# Patient Record
Sex: MALE | Race: WHITE
[De-identification: names, ages, dates, MRNs, and addresses within clinical notes are randomized per-mention and may not be internally consistent; named-entity substitution may affect disease eponyms.]

---

## 2021-06-02 ENCOUNTER — TRANSCRIPTION ENCOUNTER (OUTPATIENT)
Age: 51
End: 2021-06-02

## 2021-07-29 ENCOUNTER — EMERGENCY (EMERGENCY)
Facility: HOSPITAL | Age: 51
LOS: 0 days | Discharge: HOME | End: 2021-07-29
Attending: EMERGENCY MEDICINE
Payer: COMMERCIAL

## 2021-07-29 VITALS
RESPIRATION RATE: 17 BRPM | SYSTOLIC BLOOD PRESSURE: 130 MMHG | OXYGEN SATURATION: 97 % | DIASTOLIC BLOOD PRESSURE: 84 MMHG | TEMPERATURE: 99 F | HEART RATE: 96 BPM

## 2021-07-29 VITALS — WEIGHT: 197.98 LBS | HEIGHT: 71 IN
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
## 2021-07-29 DIAGNOSIS — M54.2 CERVICALGIA: ICD-10-CM
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
## 2021-07-29 DIAGNOSIS — S06.0X0A CONCUSSION WITHOUT LOSS OF CONSCIOUSNESS, INITIAL ENCOUNTER: ICD-10-CM

## 2021-07-29 DIAGNOSIS — S16.1XXA STRAIN OF MUSCLE, FASCIA AND TENDON AT NECK LEVEL, INITIAL ENCOUNTER: ICD-10-CM

## 2021-07-29 DIAGNOSIS — H53.8 OTHER VISUAL DISTURBANCES: ICD-10-CM
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
## 2021-07-29 PROCEDURE — 96372 THER/PROPH/DIAG INJ SC/IM: CPT

## 2021-07-29 PROCEDURE — 99284 EMERGENCY DEPT VISIT MOD MDM: CPT

## 2021-07-29 PROCEDURE — 99283 EMERGENCY DEPT VISIT LOW MDM: CPT | Mod: 25

## 2021-07-29 RX ORDER — KETOROLAC TROMETHAMINE 30 MG/ML
60 SYRINGE (ML) INJECTION ONCE
Refills: 0 | Status: DISCONTINUED | OUTPATIENT
Start: 2021-07-29 | End: 2021-07-29

## 2021-07-29 RX ADMIN — Medication 60 MILLIGRAM(S): at 16:59

## 2021-07-29 NOTE — ED STATDOCS - CARE PROVIDER_API CALL
Bon Antonio)  Internal Medicine  [address], [address]  [place] [address]  Phone: [phone]  Fax: [phone]  Follow Up Time:     Debora Neal)  Orthopaedic Surgery  [address] [facility], [address] [place] [address]  Phone: [phone]  Fax: [phone]  Follow Up Time:     Kristel Padilla)  Clinical Neurophysiology; EEGEpilepsy; Neurology; Sleep Medicine  [address] [place] [address], [address]  [place] [address]  Phone: [phone]  Fax: [phone]  Follow Up Time:

## 2021-07-29 NOTE — ED STATDOCS - PROGRESS NOTE DETAILS
51 yr. old male presents to ED with neck pain after some one fell on him from a crane. No LOC. Has blurry vision. Seen at [facility] MD and had a CT Neck. Sent to ED for MRI and Neurology evaluation. Seen and examined by attending in intake. Plan: Toradol and re evaluate. Pema TOLEDO 51 yr. old male presents to ED with neck pain after some one fell on him from a crane last Thursday. No LOC. Has blurry vision. Seen at [facility] MD and had a CT Neck. Sent to ED for MRI and Neurology evaluation. Seen and examined by attending in intake. Plan: Toradol and re evaluate. Pema TOLEDO Agreed to F/U with Spine Specialist and Neurologist for further evaluation. MTangkristin NP

## 2021-07-29 NOTE — ED STATDOCS - PROVIDER TOKENS
PROVIDER:[TOKEN:[[id number]]],PROVIDER:[TOKEN:[[id number]]],PROVIDER:[TOKEN:[[id number]]]

## 2021-07-29 NOTE — ED STATDOCS - CARE PROVIDERS DIRECT ADDRESSES
,DirectAddress_Unknown,DirectAddress_Unknown,[email or web address] in [facility] [email or web address] [place] [email or web address]

## 2021-07-29 NOTE — ED STATDOCS - OBJECTIVE STATEMENT
52 y/o male with a PMHx of presents to the ED sent in from [facility] further evaluation of neck pain. Pt states he is s/p a head injury at work on 7/22/21. States somebody fell from a crane onto his head. Pt is c/o neck pain. States he had out-patient imaging (CAT scan head/neck) done at [facility] which showed "a bulge in his neck." Was told to get an immediate MRI and see a neurologist. Was told he had a concussion. Also states his eyes also hurt with some associated blurry vision. PCP: Dr. Samainego in [place], however pt recently moved. Was given prescription for pain medications, but did not fill. Has been taking Tylenol with no relief. 52 y/o male with a PMHx of presents to the ED sent in from [facility] further evaluation of neck pain. Pt states he is s/p a head injury at work on 7/22/21. 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 Pt is c/o neck pain. States he had out-patient imaging (CAT scan head/neck) done at [facility] which showed "a bulge in his neck." Was told to get an immediate MRI and see a neurologist. Was told he had a concussion. Also states his eyes also hurt with some associated blurry vision. PCP: Dr. Samaniego in [place], however pt recently moved. Was given prescription for pain medications, but did not fill. Has been taking Tylenol with no relief.

## 2021-07-29 NOTE — ED STATDOCS - NSPTACCESSSVCSAPPTDETAILS_ED_ALL_ED_FT
Needs new PCP and Spine Specialist as well as Neurologist. URGent  Needs new PCP and Spine Specialist as well as Neurologist.

## 2021-07-29 NOTE — ED STATDOCS - NSFOLLOWUPINSTRUCTIONS_ED_ALL_ED_FT
Concussion    WHAT YOU NEED TO KNOW:    A concussion is a mild brain injury. It is usually caused by a bump or blow to the head from a fall, a motor vehicle crash, or a sports injury. Sometimes being shaken forcefully may cause a concussion.    DISCHARGE INSTRUCTIONS:    Have someone call 911 for any of the following:     Someone tries to wake you and cannot do so.      You have a seizure, increasing confusion, or a change in personality.      Your speech becomes slurred, or you have new vision problems.    Return to the emergency department if:     You have sudden and new vision problems.      You have a severe headache that does not go away.      You have arm or leg weakness, numbness, or new problems with coordination.      You have blood or clear fluid coming out of the ears or nose.    Contact your healthcare provider if:     You have nausea or are vomiting.      You feel more sleepy than usual.      Your symptoms get worse.      Your symptoms last longer than 6 weeks after the injury.      You have questions or concerns about your condition or care.    Medicines: You may need any of the following:     Acetaminophen decreases pain and fever. It is available without a doctor's order. Ask how much to take and how often to take it. Follow directions. Read the labels of all other medicines you are using to see if they also contain acetaminophen, or ask your doctor or pharmacist. Acetaminophen can cause liver damage if not taken correctly. Do not use more than 4 grams (4,000 milligrams) total of acetaminophen in one day.       NSAIDs help decrease swelling and pain or fever. This medicine is available with or without a doctor's order. NSAIDs can cause stomach bleeding or kidney problems in certain people. If you take blood thinner medicine, always ask your healthcare provider if NSAIDs are safe for you. Always read the medicine label and follow directions.      Take your medicine as directed. Contact your healthcare provider if you think your medicine is not helping or if you have side effects. Tell him or her if you are allergic to any medicine. Keep a list of the medicines, vitamins, and herbs you take. Include the amounts, and when and why you take them. Bring the list or the pill bottles to follow-up visits. Carry your medicine list with you in case of an emergency.    Self-care: Concussion symptoms usually go away within about 10 days, but they may last longer. The following may be recommended to manage your symptoms:     Rest from physical and mental activities as directed. Mental activities are those that require thinking, concentration, and attention. You will need to rest until your symptoms are gone. Rest will allow you to recover from your concussion. Ask your healthcare provider when you can return to work and other daily activities.      Have someone stay with you for the first 24 hours after your injury. Your healthcare provider should be contacted if your symptoms get worse, or you develop new symptoms.      Do not participate in sports and physical activities until your healthcare provider says it is okay. They could make your symptoms worse or lead to another concussion. Your healthcare provider will tell you when it is okay for you to return to sports or physical activities. Ask for more information about sports concussions.    Prevent another concussion:     Wear protective sports equipment that fits properly. Helmets help decrease your risk for a serious brain injury. Talk to your healthcare provider about ways you can decrease your risk for a concussion if you play sports.      Wear your seatbelt every time you travel. This helps to decrease your risk for a head injury if you are in a car accident.     Follow up with your healthcare provider as directed: Write down your questions so you remember to ask them during your visits.     FOLLOW UP EVALUATION  To ensure optimal concussion recovery, follow up with a doctor specialized in concussion management. An evaluation by a specialty concussion program can ensure timely return to activities.    We offer appointments through the [facility]’s Hotline at [phone].                                                                                                                Cervical Sprain      A cervical sprain is a stretch or tear in one or more of the ligaments in the neck. Ligaments are the tissues that connect bones. Cervical sprains can range from mild to severe. Severe cervical sprains can cause the spinal bones (vertebrae) in the neck to be unstable. This can result in spinal cord damage and in serious nervous system problems.    The time that it takes for a cervical sprain to heal depends on the cause and extent of the injury. Most cervical sprains heal in 4–6 weeks.      What are the causes?    Cervical sprains may be caused by trauma, such as an injury from a motor vehicle accident, a fall, or a sudden forward and backward whipping movement of the head and neck (whiplash injury). Mild cervical sprains may be caused by wear and tear over time.      What increases the risk?  The following factors may make you more likely to develop this condition:  •Participating in activities that have a high risk of trauma to the neck. These include contact sports, auto racing, gymnastics, and diving.      •Taking risks when driving or riding in a motor vehicle.      •Osteoarthritis of the spine.      •Poor strength and flexibility of the neck.      •A previous neck injury.      •Poor posture.      •Spending long periods in certain positions that put stress on the neck, such as sitting at a computer for a long time.        What are the signs or symptoms?  Symptoms of this condition include:  •Pain, soreness, stiffness, tenderness, swelling, or a burning sensation in the front, back, or sides of the neck, shoulders, or upper back.      •Sudden tightening of neck muscles (spasms).      •Limited ability to move the neck.      •Headache.      •Dizziness.      •Nausea or vomiting.      •Weakness, numbness, or tingling in a hand or an arm.      Symptoms may develop right away after injury, or they may develop over a few days. In some cases, symptoms may go away with treatment and return (recur) over time.      How is this diagnosed?  This condition may be diagnosed based on:  •Your medical history.      •Your symptoms.      •Any recent injuries or known neck problems that you have, such as arthritis in the neck.      •A physical exam.      •Imaging tests, such as X-rays, MRI, and CT scan.        How is this treated?  This condition is treated by resting and icing the injured area and doing physical therapy exercises. Heat therapy may be used 2–3 days after the injury occurred if there is no swelling. Depending on the severity of your condition, treatment may also include:•Keeping your neck in place (immobilized) for periods of time. This may be done using:  •A cervical collar. This supports your chin and the back of your head.      •A cervical traction device. This is a sling that holds up your head. The device removes weight and pressure from your neck, and it may help to relieve pain.        •Medicines that help to relieve pain and inflammation.      •Medicines that help to relax your muscles (muscle relaxants).      •Surgery. This is rare.        Follow these instructions at home:      Medicines      •Take over-the-counter and prescription medicines only as told by your health care provider.    •Ask your health care provider if the medicine prescribed to you:  •Requires you to avoid driving or using heavy machinery.    •Can cause constipation. You may need to take these actions to prevent or treat constipation:  •Drink enough fluid to keep your urine pale yellow.      •Take over-the-counter or prescription medicines.      •Eat foods that are high in fiber, such as beans, whole grains, and fresh fruits and vegetables.      •Limit foods that are high in fat and processed sugars, such as fried or sweet foods.          If you have a cervical collar:     •Wear the collar as told by your health care provider. Do not remove it unless told.      •Ask before making any adjustments to your collar.      •If you have long hair, keep it outside of the collar.    •Ask your health care provider if you may remove the collar for cleaning and bathing. If so:  •Follow instructions about how to remove it safely.      •Clean it by hand with mild soap and water and air-dry it completely.      •If your collar has removable pads, remove them every 1–2 days and wash them by hand with soap and water. Let them air-dry completely before putting them back in the collar.        •Tell your health care provider if your skin under the collar has irritation or sores.        Managing pain, stiffness, and swelling                   •If directed, use a cervical traction device as told.    •If directed, put ice on the affected area. To do this:  •Put ice in a plastic bag.      •Place a towel between your skin and the bag.      •Leave the ice on for 20 minutes, 2–3 times a day.      •If directed, apply heat to the affected area before you do your physical therapy or as often as told by your health care provider. Use the heat source that your health care provider recommends, such as a moist heat pack or a heating pad.  •Place a towel between your skin and the heat source.      •Leave the heat on for 20–30 minutes.      •Remove the heat if your skin turns bright red. This is especially important if you are unable to feel pain, heat, or cold. You may have a greater risk of getting burned.        Activity     • Do not drive while wearing a cervical collar. If you do not have a cervical collar, ask if it is safe to drive while your neck heals.      • Do not lift anything that is heavier than 10 lb (4.5 kg), or the limit that you are told, until your health care provider says that it is safe.      •Rest as told by your health care provider.      •If physical therapy was prescribed, do exercises as told by your health care provider or physical therapist.      •Return to your normal activities as told by your health care provider. Avoid positions and activities that make your symptoms worse. Ask your health care provider what activities are safe for you.      General instructions     • Do not use any products that contain nicotine or tobacco, such as cigarettes, e-cigarettes, and chewing tobacco. These can delay healing. If you need help quitting, ask your health care provider.      •Keep all follow-up visits as told by your health care provider or physical therapist. This is important.        How is this prevented?  To prevent a cervical sprain from happening again:  •Use and maintain good posture. Make any needed adjustments to your workstation to help you do this.      •Exercise regularly as told by your health care provider or physical therapist.      •Avoid risky activities that may cause a cervical sprain.        Contact a health care provider if you have:    •Symptoms that get worse or do not get better after 2 weeks of treatment.      •Pain that gets worse or does not get better with medicine.      •New, unexplained symptoms.      •Sores or irritated skin on your neck from wearing your cervical collar.        Get help right away if:    •You have severe pain.      •You develop numbness, tingling, or weakness in any part of your body.      •You cannot move a part of your body (you have paralysis).      •You have neck pain along with severe dizziness or headache.        Summary    •A cervical sprain is a stretch or tear in one or more of the ligaments in the neck.      •Cervical sprains may be caused by trauma, such as an injury from a motor vehicle accident, a fall, or a sudden forward and backward whipping movement of the head and neck (whiplash injury).      •Symptoms may develop right away after injury, or they may develop over a few days.      •This condition may be treated with rest, ice, heat, medicines, physical therapy, and surgery.      This information is not intended to replace advice given to you by your health care provider. Make sure you discuss any questions you have with your health care provider.      Rest. No lifting or physical exertion.  Tylenol 650 mg. PO every 4 hrs. for pain.  NO IBUPROFEN for 18 hrs.  Follow up with Spine Specialist,  Neurologist and PMD.

## 2021-07-29 NOTE — ED STATDOCS - CARE PLAN
Principal Discharge DX:	Concussion   Principal Discharge DX:	Concussion  Secondary Diagnosis:	Cervical strain

## 2021-07-29 NOTE — ED ADULT TRIAGE NOTE - CHIEF COMPLAINT QUOTE
sent in by [facility] for MRI, concussion on 07/22/2021 from head injury at work, c/o eye pain and neck pain not improving

## 2021-07-29 NOTE — ED STATDOCS - PATIENT PORTAL LINK FT
You can access the FollowMyHealth Patient Portal offered by [facility] by registering at the following website: [email or web address]. By joining [facility]’s FollowMyHealth portal, you will also be able to view your health information using other applications (apps) compatible with our system.

## 2021-07-29 NOTE — ED STATDOCS - ATTENDING CONTRIBUTION TO CARE
I, Erica Rivas MD,  performed the initial face to face bedside interview with this patient regarding history of present illness, review of symptoms and relevant past medical, social and family history.  I completed an independent physical examination.  I was the initial provider who evaluated this patient. I have signed out the follow up of any pending tests (i.e. labs, radiological studies) to the ACP.  I have communicated the patient’s plan of care and disposition with the ACP.  The history, relevant review of systems, past medical and surgical history, medical decision making, and physical examination was documented by the scribe in my presence and I attest to the accuracy of the documentation.

## 2021-08-10 PROBLEM — Z00.00 ENCOUNTER FOR PREVENTIVE HEALTH EXAMINATION: Status: ACTIVE | Noted: 2021-08-10

## 2021-08-11 ENCOUNTER — APPOINTMENT (OUTPATIENT)
Dept: PHYSICAL MEDICINE AND REHAB | Facility: CLINIC | Age: 51
End: 2021-08-11

## 2022-05-11 ENCOUNTER — NON-APPOINTMENT (OUTPATIENT)
Age: 52
End: 2022-05-11